# Patient Record
Sex: FEMALE | Race: WHITE | NOT HISPANIC OR LATINO | Employment: FULL TIME | URBAN - METROPOLITAN AREA
[De-identification: names, ages, dates, MRNs, and addresses within clinical notes are randomized per-mention and may not be internally consistent; named-entity substitution may affect disease eponyms.]

---

## 2020-11-12 ENCOUNTER — OFFICE VISIT (OUTPATIENT)
Dept: URGENT CARE | Facility: CLINIC | Age: 35
End: 2020-11-12
Payer: COMMERCIAL

## 2020-11-12 VITALS
HEIGHT: 66 IN | RESPIRATION RATE: 18 BRPM | SYSTOLIC BLOOD PRESSURE: 141 MMHG | HEART RATE: 96 BPM | DIASTOLIC BLOOD PRESSURE: 79 MMHG | BODY MASS INDEX: 28.77 KG/M2 | OXYGEN SATURATION: 97 % | TEMPERATURE: 99 F | WEIGHT: 179 LBS

## 2020-11-12 DIAGNOSIS — Z03.89 ENCOUNTER FOR OBSERVATION FOR OTHER SUSPECTED DISEASES AND CONDITIONS RULED OUT: Primary | ICD-10-CM

## 2020-11-12 DIAGNOSIS — Z20.822 EXPOSURE TO COVID-19 VIRUS: ICD-10-CM

## 2020-11-12 LAB
CTP QC/QA: YES
SARS-COV-2 RDRP RESP QL NAA+PROBE: NEGATIVE

## 2020-11-12 PROCEDURE — U0002: ICD-10-PCS | Mod: QW,S$GLB,, | Performed by: NURSE PRACTITIONER

## 2020-11-12 PROCEDURE — 99203 OFFICE O/P NEW LOW 30 MIN: CPT | Mod: S$GLB,,, | Performed by: NURSE PRACTITIONER

## 2020-11-12 PROCEDURE — U0002 COVID-19 LAB TEST NON-CDC: HCPCS | Mod: QW,S$GLB,, | Performed by: NURSE PRACTITIONER

## 2020-11-12 PROCEDURE — 99203 PR OFFICE/OUTPT VISIT, NEW, LEVL III, 30-44 MIN: ICD-10-PCS | Mod: S$GLB,,, | Performed by: NURSE PRACTITIONER

## 2020-11-12 RX ORDER — ESCITALOPRAM OXALATE 20 MG/1
20 TABLET ORAL
COMMUNITY

## 2020-11-12 NOTE — PATIENT INSTRUCTIONS
"Your test was NEGATIVE for COVID-19 (coronavirus).      You may leave home and/or return to work when the following conditions are met:   24 hours fever free without fever-reducing medications AND   Improved symptoms   You have not met the conditions of a close exposure       A "close exposure" is defined as anyone who has had an exposure (masked or unmasked) to a known COVID -19 positive person within 6 ft for longer than 15 minutes. If your exposure meets this definition you are required by CDC guidelines to quarantine for 14 days from time of exposure regardless of test status.      Additional instructions:  · Social distance per your local guidelines  · Call ahead before visiting your doctor.  · Wear a facemask when around others who do not live in your household.  · Cover your coughs and sneezes.  · Wash your hands often with soap and water; hand  can be used, too.      If your symptoms worsen or if you have any other concerns, please contact [facility] at [phone].     Sincerely,    GOLDIE Slaughter  "

## 2020-12-22 ENCOUNTER — OFFICE VISIT (OUTPATIENT)
Dept: URGENT CARE | Facility: CLINIC | Age: 35
End: 2020-12-22
Payer: COMMERCIAL

## 2020-12-22 VITALS
WEIGHT: 186 LBS | DIASTOLIC BLOOD PRESSURE: 82 MMHG | HEIGHT: 66 IN | HEART RATE: 72 BPM | TEMPERATURE: 98 F | BODY MASS INDEX: 29.89 KG/M2 | OXYGEN SATURATION: 97 % | SYSTOLIC BLOOD PRESSURE: 125 MMHG

## 2020-12-22 DIAGNOSIS — R09.81 NASAL CONGESTION: ICD-10-CM

## 2020-12-22 DIAGNOSIS — J02.9 SORE THROAT: Primary | ICD-10-CM

## 2020-12-22 LAB
CTP QC/QA: YES
SARS-COV-2 RDRP RESP QL NAA+PROBE: NEGATIVE

## 2020-12-22 PROCEDURE — U0002: ICD-10-PCS | Mod: QW,S$GLB,, | Performed by: PHYSICIAN ASSISTANT

## 2020-12-22 PROCEDURE — U0002 COVID-19 LAB TEST NON-CDC: HCPCS | Mod: QW,S$GLB,, | Performed by: PHYSICIAN ASSISTANT

## 2020-12-22 PROCEDURE — 99213 PR OFFICE/OUTPT VISIT, EST, LEVL III, 20-29 MIN: ICD-10-PCS | Mod: S$GLB,,, | Performed by: PHYSICIAN ASSISTANT

## 2020-12-22 PROCEDURE — 99213 OFFICE O/P EST LOW 20 MIN: CPT | Mod: S$GLB,,, | Performed by: PHYSICIAN ASSISTANT

## 2020-12-22 RX ORDER — MULTIVITAMIN
1 TABLET ORAL
COMMUNITY

## 2020-12-22 NOTE — PROGRESS NOTES
"Subjective:       Patient ID: Michelle Zarate is a 35 y.o. female.    Vitals:  height is 5' 6" (1.676 m) and weight is 84.4 kg (186 lb). Her tympanic temperature is 97.7 °F (36.5 °C). Her blood pressure is 125/82 and her pulse is 72. Her oxygen saturation is 97%.     Chief Complaint: Sore Throat    Symptoms began last Friday- sore throat, nasal congestion, fatigue, dry cough. Pt denies any covid positive exposure. Ran fever  of 100.7 Friday through Sunday. Pt has not had any fever since Sunday. Pt has taken tylenol for symptoms which gave mild relief.       Sore Throat   This is a new problem. The current episode started in the past 7 days. The problem has been unchanged. Neither side of throat is experiencing more pain than the other. There has been no fever. The pain is at a severity of 2/10. The pain is mild. Associated symptoms include congestion, coughing and a hoarse voice. Pertinent negatives include no abdominal pain, diarrhea, drooling, ear discharge, ear pain, headaches, plugged ear sensation, neck pain, shortness of breath, stridor, swollen glands, trouble swallowing or vomiting. She has had no exposure to strep or mono. She has tried acetaminophen for the symptoms. The treatment provided mild relief.       Constitution: Positive for fatigue. Negative for chills, sweating and fever.   HENT: Positive for congestion, postnasal drip and sore throat. Negative for ear pain, ear discharge, drooling, nosebleeds, sinus pain, sinus pressure, trouble swallowing and voice change.    Neck: Negative for neck pain and painful lymph nodes.   Eyes: Negative for eye redness.   Respiratory: Positive for cough. Negative for chest tightness, sputum production, bloody sputum, COPD, shortness of breath, stridor, wheezing and asthma.    Gastrointestinal: Negative for abdominal pain, nausea, vomiting and diarrhea.   Musculoskeletal: Negative for muscle ache.   Skin: Negative for rash and erythema.   Allergic/Immunologic: Negative " for seasonal allergies and asthma.   Neurological: Negative for headaches.   Hematologic/Lymphatic: Negative for swollen lymph nodes.       Objective:      Physical Exam   Constitutional: She is oriented to person, place, and time. She appears well-developed.   HENT:   Head: Normocephalic and atraumatic. Head is without abrasion, without contusion and without laceration.   Ears:   Right Ear: External ear normal.   Left Ear: External ear normal.   Nose: Nose normal.   Mouth/Throat: Mucous membranes are normal. Posterior oropharyngeal erythema present. No oropharyngeal exudate, posterior oropharyngeal edema or cobblestoning. No tonsillar exudate.   Eyes: Pupils are equal, round, and reactive to light. Conjunctivae, EOM and lids are normal.   Neck: Trachea normal, full passive range of motion without pain and phonation normal. Neck supple.   Cardiovascular: Normal rate, regular rhythm and normal heart sounds.   Pulmonary/Chest: Effort normal and breath sounds normal. No stridor. No respiratory distress.   Musculoskeletal: Normal range of motion.   Lymphadenopathy:     She has no cervical adenopathy.   Neurological: She is alert and oriented to person, place, and time.   Skin: Skin is warm, dry, intact and no rash. Capillary refill takes less than 2 seconds. abrasion, burn, bruising, erythema and ecchymosisPsychiatric: Her speech is normal and behavior is normal. Judgment and thought content normal.   Nursing note and vitals reviewed.    Results for orders placed or performed in visit on 12/22/20   POCT COVID-19 Rapid Screening   Result Value Ref Range    POC Rapid COVID Negative Negative     Acceptable Yes            Assessment:       1. Sore throat    2. Nasal congestion        Plan:       COVID testing negative.  Recommend OTC medications as needed for cold symptoms.  Tylenol as needed for fever.  Rest and drink plenty of fluids.  Patient can return to clinic or follow up with PCP if symptoms worsen or  fail to improve. Pt voiced understanding and all questions were answered prior to discharge.     Sore throat  -     POCT COVID-19 Rapid Screening    Nasal congestion  -     POCT COVID-19 Rapid Screening      Patient Instructions       Self-Care for Sore Throats    Sore throats happen for many reasons, such as colds, allergies, and infections caused by viruses or bacteria. In any case, your throat becomes red and sore. Your goal for self-care is to reduce your discomfort while giving your throat a chance to heal.  Moisten and soothe your throat  Tips include the following:  · Try a sip of water first thing after waking up.  · Keep your throat moist by drinking 6 or more glasses of clear liquids every day.  · Run a cool-air humidifier in your room overnight.  · Avoid cigarette smoke.   · Suck on throat lozenges, cough drops, hard candy, ice chips, or frozen fruit-juice bars. Use the sugar-free versions if your diet or medical condition requires them.  Gargle to ease irritation  Gargling every hour or 2 can ease irritation. Try gargling with 1 of these solutions:  · 1/4 teaspoon of salt in 1/2 cup of warm water  · An over-the-counter anesthetic gargle  Use medicine for more relief  Over-the-counter medicine can reduce sore throat symptoms. Ask your pharmacist if you have questions about which medicine to use:  · Ease pain with anesthetic sprays. Aspirin or an aspirin substitute also helps. Remember, never give aspirin to anyone 18 or younger, or if you are already taking blood thinners.   · For sore throats caused by allergies, try antihistamines to block the allergic reaction.  · Remember: unless a sore throat is caused by a bacterial infection, antibiotics wont help you.  Prevent future sore throats  Prevention tips include the following:  · Stop smoking or reduce contact with secondhand smoke. Smoke irritates the tender throat lining.  · Limit contact with pets and with allergy-causing substances, such as pollen  and mold.  · When youre around someone with a sore throat or cold, wash your hands often to keep viruses or bacteria from spreading.  · Dont strain your vocal cords.  Call your healthcare provider  Contact your healthcare provider if you have:  · A temperature over 101°F (38.3°C)  · White spots on the throat  · Great difficulty swallowing  · Trouble breathing  · A skin rash  · Recent exposure to someone else with strep bacteria  · Severe hoarseness and swollen glands in the neck or jaw   Date Last Reviewed: 8/1/2016 © 2000-2017 [employer]. [address] [place] [id number]. All rights reserved. This information is not intended as a substitute for professional medical care. Always follow your healthcare professional's instructions.

## 2020-12-22 NOTE — PATIENT INSTRUCTIONS

## 2020-12-22 NOTE — LETTER
[id number] [facility] [address] ? [place], [id number] ? Phone [phone] ? Fax             Return to Work/School    Patient: Michelle Zarate  YOB: 1985   Date: 12/22/2020      To Whom It May Concern:     Michelle Zarate was in contact with/seen in my office on 12/22/2020. COVID-19 is present in our communities across the [facility]. Not all patients are eligible or appropriate to be tested. In this situation, your employee meets the following criteria:     Michelle Zarate has met the criteria for COVID-19 testing and has a NEGATIVE result. The employee can return to work once they are asymptomatic for 24 hours without the use of fever reducing medications (Tylenol, Motrin, etc).     If you have any questions or concerns, or if I can be of further assistance, please do not hesitate to contact me.     Sincerely,    Destiny Mckeon PA-C

## 2021-04-28 ENCOUNTER — PATIENT MESSAGE (OUTPATIENT)
Dept: RESEARCH | Facility: HOSPITAL | Age: 36
End: 2021-04-28

## 2021-07-21 ENCOUNTER — OFFICE VISIT (OUTPATIENT)
Dept: URGENT CARE | Facility: CLINIC | Age: 36
End: 2021-07-21
Payer: COMMERCIAL

## 2021-07-21 VITALS
SYSTOLIC BLOOD PRESSURE: 133 MMHG | WEIGHT: 186 LBS | DIASTOLIC BLOOD PRESSURE: 73 MMHG | BODY MASS INDEX: 29.89 KG/M2 | OXYGEN SATURATION: 100 % | HEIGHT: 66 IN | TEMPERATURE: 98 F | HEART RATE: 82 BPM | RESPIRATION RATE: 18 BRPM

## 2021-07-21 DIAGNOSIS — R53.83 FATIGUE, UNSPECIFIED TYPE: ICD-10-CM

## 2021-07-21 DIAGNOSIS — B34.9 VIRAL SYNDROME: Primary | ICD-10-CM

## 2021-07-21 LAB
CTP QC/QA: YES
SARS-COV-2 RDRP RESP QL NAA+PROBE: NEGATIVE

## 2021-07-21 PROCEDURE — U0002: ICD-10-PCS | Mod: QW,S$GLB,, | Performed by: NURSE PRACTITIONER

## 2021-07-21 PROCEDURE — U0002 COVID-19 LAB TEST NON-CDC: HCPCS | Mod: QW,S$GLB,, | Performed by: NURSE PRACTITIONER

## 2021-07-21 PROCEDURE — 99214 OFFICE O/P EST MOD 30 MIN: CPT | Mod: S$GLB,,, | Performed by: NURSE PRACTITIONER

## 2021-07-21 PROCEDURE — 99214 PR OFFICE/OUTPT VISIT, EST, LEVL IV, 30-39 MIN: ICD-10-PCS | Mod: S$GLB,,, | Performed by: NURSE PRACTITIONER

## 2021-07-21 RX ORDER — IBUPROFEN 200 MG
TABLET ORAL EVERY 6 HOURS PRN
COMMUNITY
End: 2023-10-11

## 2021-07-21 RX ORDER — BUPROPION HYDROCHLORIDE 300 MG/1
300 TABLET ORAL DAILY
COMMUNITY
Start: 2021-04-09

## 2021-07-21 RX ORDER — LISINOPRIL 10 MG/1
10 TABLET ORAL DAILY
COMMUNITY
Start: 2021-04-09 | End: 2023-10-11

## 2021-07-21 RX ORDER — GABAPENTIN 300 MG/1
CAPSULE ORAL
COMMUNITY
End: 2022-10-05

## 2021-07-21 RX ORDER — DEXTROAMPHETAMINE SACCHARATE, AMPHETAMINE ASPARTATE, DEXTROAMPHETAMINE SULFATE AND AMPHETAMINE SULFATE 5; 5; 5; 5 MG/1; MG/1; MG/1; MG/1
1 TABLET ORAL 2 TIMES DAILY
COMMUNITY
Start: 2021-04-14 | End: 2022-10-05

## 2021-08-12 ENCOUNTER — OFFICE VISIT (OUTPATIENT)
Dept: URGENT CARE | Facility: CLINIC | Age: 36
End: 2021-08-12
Payer: COMMERCIAL

## 2021-08-12 VITALS
HEART RATE: 74 BPM | SYSTOLIC BLOOD PRESSURE: 123 MMHG | OXYGEN SATURATION: 99 % | DIASTOLIC BLOOD PRESSURE: 75 MMHG | RESPIRATION RATE: 18 BRPM | TEMPERATURE: 99 F

## 2021-08-12 DIAGNOSIS — B34.9 VIRAL SYNDROME: Primary | ICD-10-CM

## 2021-08-12 DIAGNOSIS — Z20.822 CLOSE EXPOSURE TO COVID-19 VIRUS: ICD-10-CM

## 2021-08-12 DIAGNOSIS — R50.9 FEVER, UNSPECIFIED FEVER CAUSE: ICD-10-CM

## 2021-08-12 DIAGNOSIS — Z11.52 ENCOUNTER FOR SCREENING FOR COVID-19: ICD-10-CM

## 2021-08-12 LAB
CTP QC/QA: YES
SARS-COV-2 RDRP RESP QL NAA+PROBE: NEGATIVE

## 2021-08-12 PROCEDURE — U0002 COVID-19 LAB TEST NON-CDC: HCPCS | Mod: QW,S$GLB,, | Performed by: PHYSICIAN ASSISTANT

## 2021-08-12 PROCEDURE — 99214 PR OFFICE/OUTPT VISIT, EST, LEVL IV, 30-39 MIN: ICD-10-PCS | Mod: S$GLB,,, | Performed by: PHYSICIAN ASSISTANT

## 2021-08-12 PROCEDURE — U0002: ICD-10-PCS | Mod: QW,S$GLB,, | Performed by: PHYSICIAN ASSISTANT

## 2021-08-12 PROCEDURE — 99214 OFFICE O/P EST MOD 30 MIN: CPT | Mod: S$GLB,,, | Performed by: PHYSICIAN ASSISTANT

## 2021-08-18 ENCOUNTER — OFFICE VISIT (OUTPATIENT)
Dept: URGENT CARE | Facility: CLINIC | Age: 36
End: 2021-08-18
Payer: COMMERCIAL

## 2021-08-18 VITALS
OXYGEN SATURATION: 100 % | HEIGHT: 66 IN | WEIGHT: 186 LBS | TEMPERATURE: 99 F | RESPIRATION RATE: 18 BRPM | DIASTOLIC BLOOD PRESSURE: 82 MMHG | HEART RATE: 101 BPM | BODY MASS INDEX: 29.89 KG/M2 | SYSTOLIC BLOOD PRESSURE: 133 MMHG

## 2021-08-18 DIAGNOSIS — R50.9 FEVER, UNSPECIFIED FEVER CAUSE: ICD-10-CM

## 2021-08-18 DIAGNOSIS — Z20.822 CLOSE EXPOSURE TO COVID-19 VIRUS: Primary | ICD-10-CM

## 2021-08-18 LAB
CTP QC/QA: YES
SARS-COV-2 RDRP RESP QL NAA+PROBE: NEGATIVE

## 2021-08-18 PROCEDURE — 99214 PR OFFICE/OUTPT VISIT, EST, LEVL IV, 30-39 MIN: ICD-10-PCS | Mod: S$GLB,,, | Performed by: NURSE PRACTITIONER

## 2021-08-18 PROCEDURE — U0002: ICD-10-PCS | Mod: QW,S$GLB,, | Performed by: NURSE PRACTITIONER

## 2021-08-18 PROCEDURE — 99214 OFFICE O/P EST MOD 30 MIN: CPT | Mod: S$GLB,,, | Performed by: NURSE PRACTITIONER

## 2021-08-18 PROCEDURE — U0002 COVID-19 LAB TEST NON-CDC: HCPCS | Mod: QW,S$GLB,, | Performed by: NURSE PRACTITIONER

## 2021-08-18 RX ORDER — VILAZODONE HYDROCHLORIDE 40 MG/1
TABLET ORAL
COMMUNITY
End: 2022-10-05

## 2022-01-10 ENCOUNTER — OFFICE VISIT (OUTPATIENT)
Dept: URGENT CARE | Facility: CLINIC | Age: 37
End: 2022-01-10
Payer: COMMERCIAL

## 2022-01-10 VITALS
HEART RATE: 64 BPM | WEIGHT: 186 LBS | SYSTOLIC BLOOD PRESSURE: 120 MMHG | RESPIRATION RATE: 16 BRPM | BODY MASS INDEX: 29.89 KG/M2 | HEIGHT: 66 IN | DIASTOLIC BLOOD PRESSURE: 73 MMHG | OXYGEN SATURATION: 100 % | TEMPERATURE: 98 F

## 2022-01-10 DIAGNOSIS — K52.9 GASTROENTERITIS: ICD-10-CM

## 2022-01-10 DIAGNOSIS — J02.9 PHARYNGITIS, UNSPECIFIED ETIOLOGY: ICD-10-CM

## 2022-01-10 DIAGNOSIS — R09.81 SINUS CONGESTION: Primary | ICD-10-CM

## 2022-01-10 LAB
CTP QC/QA: YES
SARS-COV-2 RDRP RESP QL NAA+PROBE: NEGATIVE

## 2022-01-10 PROCEDURE — U0002 COVID-19 LAB TEST NON-CDC: HCPCS | Mod: QW,S$GLB,, | Performed by: NURSE PRACTITIONER

## 2022-01-10 PROCEDURE — U0002: ICD-10-PCS | Mod: QW,S$GLB,, | Performed by: NURSE PRACTITIONER

## 2022-01-10 PROCEDURE — 99213 PR OFFICE/OUTPT VISIT, EST, LEVL III, 20-29 MIN: ICD-10-PCS | Mod: S$GLB,,, | Performed by: NURSE PRACTITIONER

## 2022-01-10 PROCEDURE — 99213 OFFICE O/P EST LOW 20 MIN: CPT | Mod: S$GLB,,, | Performed by: NURSE PRACTITIONER

## 2022-01-10 RX ORDER — ONDANSETRON 4 MG/1
4 TABLET, ORALLY DISINTEGRATING ORAL EVERY 6 HOURS PRN
Qty: 20 TABLET | Refills: 0 | Status: SHIPPED | OUTPATIENT
Start: 2022-01-10 | End: 2022-10-05

## 2022-01-10 RX ORDER — PREDNISONE 20 MG/1
40 TABLET ORAL
Status: COMPLETED | OUTPATIENT
Start: 2022-01-10 | End: 2022-01-10

## 2022-01-10 RX ORDER — AMOXICILLIN 500 MG/1
500 TABLET, FILM COATED ORAL EVERY 12 HOURS
Qty: 20 TABLET | Refills: 0 | Status: SHIPPED | OUTPATIENT
Start: 2022-01-10 | End: 2022-01-20

## 2022-01-10 RX ADMIN — PREDNISONE 40 MG: 20 TABLET ORAL at 07:01

## 2022-01-11 NOTE — PATIENT INSTRUCTIONS
Take amoxicillin tablets by mouth twice daily for 10 days - Pharyngitis   Take Zofran tablets as needed nausea or vomiting   Clear liquids over the next 24-48 hours advance as tolerated Patient Education       Sore Throat in Adults   The Basics   Written by the doctors and editors at [facility]   When should I see a doctor or nurse about a sore throat? -- Most people do not need to see a doctor about a sore throat. It usually gets better on its own. But sore throat can sometimes be serious.  See a doctor or nurse if:  · You have a fever of at least 101°F or 38.4°C  · Your throat pain is severe within the first 2 days, or does not start to improve within 5 to 7 days  During the coronavirus disease 2019 (COVID-19) pandemic, you should also call your doctor if you have a sore throat. They will ask you questions about your symptoms and whether you might be at risk. They can also tell you if you should get tested for the virus.  Call for an ambulance (in the [place] and [place], dial 9-1-1) or go to the emergency room if you:  · Have trouble breathing  · Are drooling because you cannot swallow your saliva  · Have swelling of the neck or tongue  · Cannot move your neck or have trouble opening your mouth  What causes sore throat? -- Sore throat is usually caused by an infection. Two types of germs can cause it: viruses and bacteria. People who have a sore throat caused by a virus do not usually need to see a doctor or nurse. However, during the COVID-19 pandemic, most people with a sore throat will need to be tested for the virus that causes COVID-19  People who have a sore throat caused by bacteria might need to see a doctor or nurse. They might have a type of infection called strep throat. Only about 1 in 10 adults who seek medical care for sore throat have strep throat.   How can I tell if my sore throat is caused by a virus or strep throat? -- It is hard to tell the difference. But there are some clues to look for.  People who  "have a sore throat caused by a virus usually have other symptoms, such as:  · A runny nose  · A stuffed-up chest  · Itchy or red eyes  · Cough  People who have COVID-19 can have different symptoms including fever, cough, trouble breathing, and problems with their sense of smell or taste. Some people have other symptoms, too. In most cases, the only way to know for sure if you have COVID-19 is to get tested.  People who have a sore throat caused by strep throat do not usually have a cough, runny nose, or itchy or red eyes. They might have been in close contact with another person who has strep throat. They might also have:  · Severe throat pain  · Fever (temperature higher than 100.4°F or 38°C)  · Swollen glands in the neck  · A rash  If you think you have strep throat, the doctor or nurse can check you for it easily. They can run a swab (Q-Tip) along the back of your throat and test it for the bacteria that cause strep throat.  Do I need antibiotics? -- If you have an infection caused by a virus, you do not need antibiotics. But if you have strep throat, you should get antibiotics. Most people with strep throat get better without antibiotics, but doctors and nurses often prescribe them anyway. That's because antibiotics can prevent problems sometimes caused by strep throat. Plus, antibiotics can reduce the symptoms of strep throat and prevent its spread to other people.  What can I do to feel better? -- If you want some relief from the pain of sore throat, you can take pain medicine that you can get without a prescription. Throat sprays are no better at soothing pain than sucking on cough drops or candy. Some people feel relief if they gargle with salt water.  When can I go back to work or school? -- If you have strep throat, wait 1 day after starting antibiotics. By then you will be a lot less likely to spread the infection.  If you have COVID-19, stay home from work or school and "self-isolate" until your doctor " or nurse tells you it's safe to stop. Self-isolation means staying apart from other people, even the people you live with. When you can stop self-isolation will depend on how long it has been since you had symptoms, and in some cases, whether you have had a negative test (showing that the virus is no longer in your body).  If you have a sore throat that is not due to strep throat or COVID-19, you can go back to your usual activities as soon as you feel well. But it's still a good idea to wash your hands often and cover your mouth if you cough.  What can I do to prevent getting a sore throat again? -- Wash your hands often with soap and water. It is one of the best ways to prevent the spread of infection. The table has instructions on how to wash your hands to prevent spreading illness (table 1).  All topics are updated as new evidence becomes available and our peer review process is complete.  This topic retrieved from [employer] on: Sep 21, 2021.  Topic 21680 Version 16.0  Release: 29.4.2 - C29.263  © 2021 UpToDate, Inc. and/or its affiliates. All rights reserved.  table 1: Hand washing to prevent spreading illness  · Wet your hands and put soap on them    · Rub your hands together for at least 20 seconds. Make sure to clean your wrists, fingernails, and in between your fingers.    · Rinse your hands    · Dry your hands with a paper towel that you can throw away    If you are not near a sink, you can use a hand gel to clean your hands. The gels with at least 60 percent alcohol work the best. But it is better to wash with soap and water if you can.  Graphic 172793 Version 3.0  Consumer Information Use and Disclaimer   This information is not specific medical advice and does not replace information you receive from your health care provider. This is only a brief summary of general information. It does NOT include all information about conditions, illnesses, injuries, tests, procedures, treatments, therapies, discharge  instructions or life-style choices that may apply to you. You must talk with your health care provider for complete information about your health and treatment options. This information should not be used to decide whether or not to accept your health care provider's advice, instructions or recommendations. Only your health care provider has the knowledge and training to provide advice that is right for you. The use of this information is governed by the [employer] End User License Agreement, available at https://www.AgentPair/en/solutions/M[employer]Innovations/about/britney.The use of [employer] content is governed by the [employer] Terms of Use. ©2021 UpToDate, Inc. All rights reserved.  Copyright   © 2021 UpToDate, Inc. and/or its affiliates. All rights reserved.  Patient Education       Diarrhea in Adolescents and Adults   The Basics   Written by the doctors and editors at [facility]   What is diarrhea? -- Diarrhea describes bowel movements that are runny or watery, and happen 3 or more times in a day. Diarrhea is very common. Most adolescents and adults have diarrhea about 4 times a year. Just about everyone has it at some point.  What causes diarrhea? -- Diarrhea can be caused by:  · Viruses  · Bacteria that live in food or water  · Parasites, such as tiny worms that you can catch in some countries  · Side effects from some medicines  · Problems digesting certain types of food  · Diseases that harm the digestive system (figure 1)  Is there anything I can do on my own to get better? -- Yes. Here are some things you can try at home:  · Drink a lot of liquids that have water, salt, and sugar. Good choices are water mixed with juice, flavored soda, and soup broth. If you are drinking enough fluids, your urine will be light yellow or almost clear.  · Try to eat a little food. Good choices are potatoes, noodles, rice, oatmeal, crackers, bananas, soup, and boiled vegetables. Salty foods also help.  Should I see a doctor or  "nurse? -- See your doctor or nurse if:  · You have more than 6 runny bowel movements in 24 hours  · You have blood in your bowel movements   · You have a fever higher than 101.3ºF (38.5ºC) that does not go away after a day  · You have severe belly pain  · You are 70 or older  · Your body has lost too much water. This is called "dehydration." Signs include:  ? Lots of diarrhea that is very watery  ? Feeling very tired  ? Thirst  ? Dry mouth or tongue  ? Muscle cramps  ? Dizziness  ? Confusion  ? Urine that is very yellow, or not needing to urinate for more than 5 hours  Will I need tests? -- Many people do not need to have tests. But it's possible that your doctor will do tests to check if you are dehydrated or to figure out what is causing your diarrhea. Your doctor might do:  · Blood tests  · Tests on a sample of your bowel movements  How is diarrhea treated? -- That depends on what is causing your diarrhea. You might not need any treatment. If you do, your doctor might recommend:  · Fluids through an "IV" - An IV is a thin tube that goes into your vein. People with a lot of diarrhea might need IV fluids to treat or prevent dehydration.  · Stopping some of your medicines  · Changing the foods you eat  · Antibiotics - These medicines treat bacterial infections. Most people do not need antibiotics, even if they have a bacterial infection. If you are very sick with fever and blood in your bowel movements, your doctor might prescribe antibiotics to help you get better faster.   · Medicines that ease diarrhea - These medicines include loperamide (brand name: Imodium), diphenoxylate-atropine (brand name: Lomotil), and bismuth subsalicylate (brand names: Pepto-Bismol, Kaopectate). You should not take loperamide or diphenoxylate-atropine if you have a fever or blood in your bowel movements. Also, taking too much loperamide has led to serious heart problems in some people. If you have health problems or already take other " "medicines, talk to your doctor or nurse before trying loperamide. For all of these medicines, it's important to not take more than the label tells you to.   Can diarrhea be prevented? -- You can reduce your chances of getting and spreading diarrhea by:  · Washing your hands after changing diapers, cooking, eating, going to the bathroom, taking out the trash, touching animals, and blowing your nose.  · Staying home from work or school until you feel better.  · Paying attention to food safety. Tips include:  ? Not drinking unpasteurized milk or foods made with it  ? Washing fruits and vegetables well before eating them  ? Keeping the refrigerator colder than 40ºF and the freezer below 0ºF  ? Cooking meat and seafood until well done  ? Cooking eggs until the yolk is firm  ? Washing hands, knives, and cutting boards after they touch raw food  For more tips on food safety, see the table (table 1).  All topics are updated as new evidence becomes available and our peer review process is complete.  This topic retrieved from [employer] on: Sep 21, 2021.  Topic 72487 Version 14.0  Release: 29.4.2 - C29.263  © 2021 UpToDate, Inc. and/or its affiliates. All rights reserved.  figure 1: Digestive system     This drawing shows the organs in the body that process food. Together these organs are called "the digestive system," or "digestive tract." As food travels through this system, the body absorbs nutrients and water.  Graphic 15687 Version 4.0    table 1: Tips for safe food handling[1]  Purchase    Do not buy already-cooked food that is stored next to raw food, even if it is stored on ice.   Do not buy food in cans that are dented, cracked, or have a bulging lid.   Storage    Make sure meat and poultry products are refrigerated when bought.   Use plastic bags to keep juices from meat and fish from touching other foods.   Store perishable items (that can go bad quickly) in the refrigerator within an hour of buying.   Keep " refrigerator temperature between 32 and 40°F (0 and 4°C) and freezer temperature at or below 0°F (-18°C).   Freeze meat and poultry that will not be cooked within 48 hours.   Freeze tuna, bluefish, and stefan-stefan that will not be cooked within 24 hours. Other fish can be stored in the refrigerator for 48 hours.   Do not store eggs on the refrigerator door (since that is the warmest part of the refrigerator).   Put leftovers in the refrigerator within 2 hours of cooking them.   Divide leftovers into parts and store in small containers.   Reheat leftovers to 165°F (74°C) before eating.   Preparation    Wash hands with soap and water before cooking and after handling raw meat, poultry, fish, or raw eggs.   Thaw frozen meats and fish in the refrigerator or microwave, not by leaving them out.   Marinate foods in the refrigerator, not at room temperature.   Avoid contact of cooked foods with forks, spoons, knives, plates, or areas that might not be clean.   Wash forks, spoons, knives, plates, and cutting areas with soap and water after they have touched raw meat, poultry, fish, or eggs.   Avoid letting the juices from uncooked meat, poultry, or fish touch cooked foods or foods that will be eaten raw.   Carefully wash all fresh fruits and vegetables.   Avoid recipes that include raw eggs.   Cooking    Use a meat thermometer.  · Cook beef, veal, and lamb (steaks, roasts, chops) to 145°F (63°C) and rest for 3 minutes.   · Cook ground beef, pork, veal, and lamb to 160°F (71°C).   · Cook poultry (chicken, turkey) to 165°F (74°C).   · Cook fresh pork (roasts, chops, ham that is not precooked) to 145°F (63°C) and rest for 3 minutes.   · Cook precooked ham to 140°F (60°C).   · Cook fish until the flesh is firm and separates easily with a fork.   · Cook shellfish until the flesh is firm.    Cook eggs until the yolk and white are firm.   Boil juices from raw meat or fish before using on cooked food.   Serving    Serve cooked foods  on clean plates with clean forks, spoons, and knives.   Keep hot foods at 140°F (60°C) and cold foods below 40°F (4°C).   Never leave foods at room temperature longer than 2 hours, or 1 hour if the room is hotter than 90°F (32°C).   Use coolers and ice packs to take perishable foods (that might go bad) away from home.   Graphic 96287 Version 7.0  Consumer Information Use and Disclaimer   This information is not specific medical advice and does not replace information you receive from your health care provider. This is only a brief summary of general information. It does NOT include all information about conditions, illnesses, injuries, tests, procedures, treatments, therapies, discharge instructions or life-style choices that may apply to you. You must talk with your health care provider for complete information about your health and treatment options. This information should not be used to decide whether or not to accept your health care provider's advice, instructions or recommendations. Only your health care provider has the knowledge and training to provide advice that is right for you. The use of this information is governed by the [employer] End User License Agreement, available at https://[email or web address].[facility]/en/solutions/Ruckus/about/britney.The use of [employer] content is governed by the [employer] Terms of Use. ©2021 UpToDate, Inc. All rights reserved.  Copyright   © 2021 UpToDate, Inc. and/or its affiliates. All rights reserved.

## 2022-01-11 NOTE — PROGRESS NOTES
"Subjective:       Patient ID: Michelle Zarate is a 36 y.o. female.    Vitals:  height is 5' 6" (1.676 m) and weight is 84.4 kg (186 lb). Her tympanic temperature is 98.3 °F (36.8 °C). Her blood pressure is 120/73 and her pulse is 64. Her respiration is 16 and oxygen saturation is 100%.     Chief Complaint: Headache    Headache   This is a new problem. The current episode started in the past 7 days (1/8/22). The problem occurs constantly. The problem has been gradually worsening. The pain is located in the bilateral, temporal and occipital region. The pain quality is similar to prior headaches. The quality of the pain is described as throbbing. The pain is at a severity of 5/10. The pain is moderate. Associated symptoms include anorexia, coughing, ear pain, facial sweating, a fever, insomnia, muscle aches, nausea, rhinorrhea, sinus pressure, a sore throat and vomiting. Pertinent negatives include no abdominal pain, abnormal behavior, back pain, blurred vision, drainage, eye pain, eye redness, eye watering, hearing loss, loss of balance, neck pain, numbness, phonophobia, photophobia, scalp tenderness, seizures, swollen glands, tingling, tinnitus, visual change, weakness or weight loss. She has tried nothing for the symptoms. The treatment provided no relief.       Constitution: Positive for fever.   HENT: Positive for ear pain, sinus pressure and sore throat. Negative for tinnitus and hearing loss.    Neck: Negative for neck pain.   Eyes: Negative for eye pain, eye redness, photophobia and blurred vision.   Respiratory: Positive for cough.    Gastrointestinal: Positive for nausea and vomiting. Negative for abdominal pain.   Musculoskeletal: Negative for back pain.   Neurological: Positive for headaches. Negative for loss of balance, numbness and seizures.   Psychiatric/Behavioral: The patient has insomnia.        Objective:      Physical Exam   Constitutional: She is oriented to person, place, and time.   HENT:   Head: " Normocephalic and atraumatic.   Ears:   Right Ear: Tympanic membrane, external ear and ear canal normal.   Left Ear: Tympanic membrane, external ear and ear canal normal.   Nose: Nose normal.   Mouth/Throat: Mucous membranes are moist. Posterior oropharyngeal erythema present.   Eyes: Conjunctivae are normal. Pupils are equal, round, and reactive to light.      extraocular movement intact   Neck: Neck supple.   Cardiovascular: Normal rate, regular rhythm, normal heart sounds and normal pulses.   Pulmonary/Chest: Effort normal and breath sounds normal.   Abdominal: Normal appearance.   Neurological: no focal deficit. She is alert and oriented to person, place, and time.   Skin: Skin is warm. Capillary refill takes less than 2 seconds.   Vitals reviewed.        Assessment:       1. Sinus congestion    2. Pharyngitis, unspecified etiology    3. Gastroenteritis          Plan:         Sinus congestion  -     POCT COVID-19 Rapid Screening    Pharyngitis, unspecified etiology    Gastroenteritis    Other orders  -     predniSONE tablet 40 mg  -     ondansetron (ZOFRAN-ODT) 4 MG TbDL; Take 1 tablet (4 mg total) by mouth every 6 (six) hours as needed (nausea/vomiting).  Dispense: 20 tablet; Refill: 0  -     amoxicillin (AMOXIL) 500 MG Tab; Take 1 tablet (500 mg total) by mouth every 12 (twelve) hours. for 10 days  Dispense: 20 tablet; Refill: 0         lucia amoxicillin tablets by mouth twice daily for 10 days - Pharyngitis   Take Zofran tablets as needed nausea or vomiting   Cear liquids over the next 24-48 hours advance as tolerated

## 2022-09-27 ENCOUNTER — OFFICE VISIT (OUTPATIENT)
Dept: URGENT CARE | Facility: CLINIC | Age: 37
End: 2022-09-27
Payer: COMMERCIAL

## 2022-09-27 VITALS
BODY MASS INDEX: 29.89 KG/M2 | RESPIRATION RATE: 16 BRPM | SYSTOLIC BLOOD PRESSURE: 114 MMHG | TEMPERATURE: 99 F | DIASTOLIC BLOOD PRESSURE: 72 MMHG | WEIGHT: 186 LBS | OXYGEN SATURATION: 99 % | HEART RATE: 90 BPM | HEIGHT: 66 IN

## 2022-09-27 DIAGNOSIS — R51.9 NONINTRACTABLE HEADACHE, UNSPECIFIED CHRONICITY PATTERN, UNSPECIFIED HEADACHE TYPE: ICD-10-CM

## 2022-09-27 DIAGNOSIS — R09.81 SINUS CONGESTION: ICD-10-CM

## 2022-09-27 DIAGNOSIS — H66.001 NON-RECURRENT ACUTE SUPPURATIVE OTITIS MEDIA OF RIGHT EAR WITHOUT SPONTANEOUS RUPTURE OF TYMPANIC MEMBRANE: Primary | ICD-10-CM

## 2022-09-27 DIAGNOSIS — J02.9 SORE THROAT: ICD-10-CM

## 2022-09-27 DIAGNOSIS — R05.9 COUGH: ICD-10-CM

## 2022-09-27 LAB
CTP QC/QA: YES
MOLECULAR STREP A: NEGATIVE
POC MOLECULAR INFLUENZA A AGN: NEGATIVE
POC MOLECULAR INFLUENZA B AGN: NEGATIVE
SARS-COV-2 RDRP RESP QL NAA+PROBE: NEGATIVE

## 2022-09-27 PROCEDURE — 99214 PR OFFICE/OUTPT VISIT, EST, LEVL IV, 30-39 MIN: ICD-10-PCS | Mod: S$GLB,,, | Performed by: PHYSICIAN ASSISTANT

## 2022-09-27 PROCEDURE — 87502 POCT INFLUENZA A/B MOLECULAR: ICD-10-PCS | Mod: QW,S$GLB,, | Performed by: PHYSICIAN ASSISTANT

## 2022-09-27 PROCEDURE — U0002: ICD-10-PCS | Mod: QW,S$GLB,, | Performed by: PHYSICIAN ASSISTANT

## 2022-09-27 PROCEDURE — 87502 INFLUENZA DNA AMP PROBE: CPT | Mod: QW,S$GLB,, | Performed by: PHYSICIAN ASSISTANT

## 2022-09-27 PROCEDURE — 87651 POCT STREP A MOLECULAR: ICD-10-PCS | Mod: QW,S$GLB,, | Performed by: PHYSICIAN ASSISTANT

## 2022-09-27 PROCEDURE — U0002 COVID-19 LAB TEST NON-CDC: HCPCS | Mod: QW,S$GLB,, | Performed by: PHYSICIAN ASSISTANT

## 2022-09-27 PROCEDURE — 87651 STREP A DNA AMP PROBE: CPT | Mod: QW,S$GLB,, | Performed by: PHYSICIAN ASSISTANT

## 2022-09-27 PROCEDURE — 99214 OFFICE O/P EST MOD 30 MIN: CPT | Mod: S$GLB,,, | Performed by: PHYSICIAN ASSISTANT

## 2022-09-27 RX ORDER — LISDEXAMFETAMINE DIMESYLATE 60 MG/1
60 CAPSULE ORAL EVERY MORNING
COMMUNITY
End: 2023-10-11

## 2022-09-27 RX ORDER — FLUTICASONE PROPIONATE 50 MCG
1 SPRAY, SUSPENSION (ML) NASAL DAILY
Qty: 16 ML | Refills: 0 | Status: SHIPPED | OUTPATIENT
Start: 2022-09-27 | End: 2023-10-11

## 2022-09-27 RX ORDER — AMOXICILLIN AND CLAVULANATE POTASSIUM 875; 125 MG/1; MG/1
1 TABLET, FILM COATED ORAL 2 TIMES DAILY
Qty: 20 TABLET | Refills: 0 | Status: SHIPPED | OUTPATIENT
Start: 2022-09-27 | End: 2022-10-07

## 2022-09-27 RX ORDER — GUAIFENESIN/DEXTROMETHORPHAN 100-10MG/5
10 SYRUP ORAL EVERY 4 HOURS PRN
Qty: 118 ML | Refills: 0 | Status: SHIPPED | OUTPATIENT
Start: 2022-09-27 | End: 2022-10-07

## 2022-09-27 NOTE — LETTER
[address] [facility] [address] ? [place], [address] ? Phone [phone] ? Fax             Return to Work/School    Patient: Michelle Zarate  YOB: 1985   Date: 09/27/2022      To Whom It May Concern:     Michelle Zarate was in contact with/seen in my office on 09/27/2022. COVID-19 is present in our communities across the [facility]. Not all patients are eligible or appropriate to be tested. In this situation, your employee meets the following criteria:     Michelle Zarate has met the criteria for COVID-19 testing and has a NEGATIVE result. The employee can return to work once they are asymptomatic for 24 hours without the use of fever reducing medications (Tylenol, Motrin, etc).     If you have any questions or concerns, or if I can be of further assistance, please do not hesitate to contact me.     Sincerely,    Destiny Mckeon PA-C

## 2022-09-27 NOTE — PROGRESS NOTES
"Subjective:       Patient ID: Michelle Zarate is a 37 y.o. female.    Vitals:  height is 5' 6" (1.676 m) and weight is 84.4 kg (186 lb). Her tympanic temperature is 98.5 °F (36.9 °C). Her blood pressure is 114/72 and her pulse is 90. Her respiration is 16 and oxygen saturation is 99%.     Chief Complaint: Sore Throat (Chest pain, headache,body aches, and fever)    Pt presents to the clinic today with sore throat, congestion, ear pain, headache, body aches, fever at home (100.3 last night) that all began about 3 days ago. Denies any sick contacts. Not taking any medications for symptoms. Pt requesting covid, flu, and strep test.     Sore Throat   This is a new problem. The current episode started in the past 7 days. The problem has been gradually worsening. Neither side of throat is experiencing more pain than the other. There has been no fever. The fever has been present for Less than 1 day. The pain is at a severity of 6/10. The pain is moderate. Associated symptoms include congestion, coughing, ear pain, headaches and a plugged ear sensation. Pertinent negatives include no abdominal pain, ear discharge, shortness of breath, swollen glands, trouble swallowing or vomiting. She has tried nothing for the symptoms.     Constitution: Positive for fever.   HENT:  Positive for ear pain, congestion and sore throat. Negative for ear discharge and trouble swallowing.    Cardiovascular: Negative.    Respiratory:  Positive for cough. Negative for shortness of breath and wheezing.    Gastrointestinal: Negative.  Negative for abdominal pain, nausea and vomiting.   Genitourinary: Negative.    Musculoskeletal:  Positive for muscle ache.   Neurological:  Positive for headaches.     Objective:      Physical Exam   Constitutional: She appears well-developed.  Non-toxic appearance. She appears ill. No distress.   HENT:   Head: Normocephalic and atraumatic.   Ears:   Right Ear: External ear and ear canal normal. Tympanic membrane is " erythematous and bulging. Tympanic membrane is not perforated. A middle ear effusion is present.   Left Ear: External ear and ear canal normal. Tympanic membrane is bulging.  No middle ear effusion.   Nose: Mucosal edema and congestion present.   Mouth/Throat: Uvula is midline. Mucous membranes are moist. Posterior oropharyngeal erythema and cobblestoning present. No oropharyngeal exudate. No tonsillar exudate.   Eyes: Conjunctivae and EOM are normal.   Neck: Neck supple.   Pulmonary/Chest: Effort normal and breath sounds normal.   Abdominal: Normal appearance.   Musculoskeletal: Normal range of motion.         General: Normal range of motion.   Lymphadenopathy:     She has no cervical adenopathy.   Neurological: no focal deficit. She is alert. She displays no weakness. Gait normal.   Skin: Skin is warm, dry, not diaphoretic, not pale and no rash.   Psychiatric: Her behavior is normal.       Results for orders placed or performed in visit on 09/27/22   POCT COVID-19 Rapid Screening   Result Value Ref Range    POC Rapid COVID Negative Negative     Acceptable Yes    POCT Influenza A/B MOLECULAR   Result Value Ref Range    POC Molecular Influenza A Ag Negative Negative, Not Reported    POC Molecular Influenza B Ag Negative Negative, Not Reported     Acceptable Yes    POCT Strep A, Molecular   Result Value Ref Range    Molecular Strep A, POC Negative Negative     Acceptable Yes        Assessment:       1. Non-recurrent acute suppurative otitis media of right ear without spontaneous rupture of tympanic membrane    2. Nonintractable headache, unspecified chronicity pattern, unspecified headache type    3. Sinus congestion    4. Cough    5. Sore throat          Plan:         Non-recurrent acute suppurative otitis media of right ear without spontaneous rupture of tympanic membrane  -     amoxicillin-clavulanate 875-125mg (AUGMENTIN) 875-125 mg per tablet; Take 1 tablet by mouth 2  (two) times daily. for 10 days  Dispense: 20 tablet; Refill: 0  -     dextromethorphan-guaiFENesin  mg/5 ml (ROBITUSSIN-DM)  mg/5 mL liquid; Take 10 mLs by mouth every 4 (four) hours as needed (cough).  Dispense: 118 mL; Refill: 0  -     fluticasone propionate (FLONASE) 50 mcg/actuation nasal spray; 1 spray (50 mcg total) by Each Nostril route once daily.  Dispense: 16 mL; Refill: 0    Nonintractable headache, unspecified chronicity pattern, unspecified headache type  -     POCT COVID-19 Rapid Screening  -     POCT Influenza A/B MOLECULAR    Sinus congestion  -     dextromethorphan-guaiFENesin  mg/5 ml (ROBITUSSIN-DM)  mg/5 mL liquid; Take 10 mLs by mouth every 4 (four) hours as needed (cough).  Dispense: 118 mL; Refill: 0  -     fluticasone propionate (FLONASE) 50 mcg/actuation nasal spray; 1 spray (50 mcg total) by Each Nostril route once daily.  Dispense: 16 mL; Refill: 0    Cough  -     dextromethorphan-guaiFENesin  mg/5 ml (ROBITUSSIN-DM)  mg/5 mL liquid; Take 10 mLs by mouth every 4 (four) hours as needed (cough).  Dispense: 118 mL; Refill: 0    Sore throat  -     POCT Strep A, Molecular

## 2022-09-30 ENCOUNTER — TELEPHONE (OUTPATIENT)
Dept: URGENT CARE | Facility: CLINIC | Age: 37
End: 2022-09-30
Payer: COMMERCIAL

## 2024-05-21 ENCOUNTER — HOSPITAL ENCOUNTER (OUTPATIENT)
Dept: RADIOLOGY | Facility: CLINIC | Age: 39
Discharge: HOME | End: 2024-05-21
Attending: PHYSICIAN ASSISTANT
Payer: COMMERCIAL

## 2024-05-21 ENCOUNTER — OFFICE VISIT (OUTPATIENT)
Dept: URGENT CARE | Facility: CLINIC | Age: 39
End: 2024-05-21
Payer: COMMERCIAL

## 2024-05-21 VITALS
TEMPERATURE: 100 F | DIASTOLIC BLOOD PRESSURE: 79 MMHG | HEART RATE: 108 BPM | RESPIRATION RATE: 16 BRPM | HEIGHT: 66 IN | WEIGHT: 143.31 LBS | SYSTOLIC BLOOD PRESSURE: 141 MMHG | BODY MASS INDEX: 23.03 KG/M2 | OXYGEN SATURATION: 96 %

## 2024-05-21 DIAGNOSIS — R09.89 RHONCHI: ICD-10-CM

## 2024-05-21 DIAGNOSIS — R05.8 PRODUCTIVE COUGH: ICD-10-CM

## 2024-05-21 DIAGNOSIS — J18.9 PNEUMONIA OF RIGHT LOWER LOBE DUE TO INFECTIOUS ORGANISM: Primary | ICD-10-CM

## 2024-05-21 PROCEDURE — 71046 X-RAY EXAM CHEST 2 VIEWS: CPT | Mod: S$GLB,,, | Performed by: RADIOLOGY

## 2024-05-21 PROCEDURE — 99213 OFFICE O/P EST LOW 20 MIN: CPT | Mod: S$GLB,,, | Performed by: PHYSICIAN ASSISTANT

## 2024-05-21 RX ORDER — PROMETHAZINE HYDROCHLORIDE AND DEXTROMETHORPHAN HYDROBROMIDE 6.25; 15 MG/5ML; MG/5ML
5 SYRUP ORAL EVERY 6 HOURS PRN
Qty: 180 ML | Refills: 0 | Status: SHIPPED | OUTPATIENT
Start: 2024-05-21

## 2024-05-21 RX ORDER — AZITHROMYCIN 250 MG/1
TABLET, FILM COATED ORAL
Qty: 6 EACH | Refills: 0 | Status: SHIPPED | OUTPATIENT
Start: 2024-05-21 | End: 2024-05-26

## 2024-05-21 RX ORDER — AMOXICILLIN 500 MG/1
1000 CAPSULE ORAL EVERY 8 HOURS
Qty: 30 CAPSULE | Refills: 0 | Status: SHIPPED | OUTPATIENT
Start: 2024-05-21 | End: 2024-05-26

## 2024-05-21 NOTE — LETTER
May 21, 2024      [facility]  [id number] [place] [address] [address]  [facility] [address]  Phone: [phone]       Patient: Michelle Zarate   YOB: 1985  Date of Visit: 05/21/2024    To Whom It May Concern:    Dipak Zarate  was at [facility] on 05/21/2024. The patient may return to work/school on 05/26/24 with no restrictions. If you have any questions or concerns, or if I can be of further assistance, please do not hesitate to contact me.    Sincerely,    Destiny Mckeon PA-C

## 2024-05-21 NOTE — PROGRESS NOTES
"Subjective:      Patient ID: Michelle Zarate is a 39 y.o. female.    Vitals:  height is 5' 6.42" (1.687 m) and weight is 65 kg (143 lb 4.8 oz). Her oral temperature is 99.8 °F (37.7 °C). Her blood pressure is 141/79 (abnormal) and her pulse is 108. Her respiration is 16 and oxygen saturation is 96%.     Chief Complaint: Fever (Entered by patient) and Cough    Pt presents to the clinic today with productive cough, body aches, right ear pressure, and dizziness that all began 5 days ago. Cough is consistent. Denies wheezing or chest pain but feels congestion in her chest.  Tmax 102.5 F, fever has been consistently daily but has been decreasing (temp today 99.8). Taking Mucinex, tylenol and ibuprofen with little relief.     Fever   This is a new problem. The current episode started in the past 7 days. The problem occurs intermittently. The maximum temperature noted was 100 to 100.9 F. Temperature source: Forehead. Associated symptoms include coughing and muscle aches. Pertinent negatives include no abdominal pain, chest pain, congestion, diarrhea, ear pain, headaches, nausea, rash, sleepiness, sore throat, urinary pain, vomiting or wheezing. She has tried acetaminophen (6:00 am this morning) for the symptoms. The treatment provided mild relief.   Risk factors: recent sickness    Risk factors: no contaminated food, no contaminated water, no hx of cancer, no immunosuppression, no occupational exposure, no recent travel and no sick contacts    Cough  This is a new problem. The current episode started in the past 7 days. The problem has been unchanged. The problem occurs every few minutes. The cough is Productive of sputum. Associated symptoms include ear congestion, a fever, nasal congestion and postnasal drip. Pertinent negatives include no chest pain, chills, ear pain, headaches, heartburn, hemoptysis, myalgias, rash, rhinorrhea, sore throat, shortness of breath, sweats, weight loss or wheezing. Nothing aggravates the " symptoms. She has tried nothing for the symptoms. There is no history of asthma, bronchiectasis, bronchitis, COPD, emphysema, environmental allergies or pneumonia.       Constitution: Positive for fatigue and fever. Negative for chills.   HENT:  Positive for postnasal drip. Negative for ear pain, congestion and sore throat.    Cardiovascular:  Negative for chest pain, palpitations and sob on exertion.   Respiratory:  Positive for cough and sputum production. Negative for bloody sputum, shortness of breath, wheezing and asthma.    Gastrointestinal:  Negative for abdominal pain, nausea, vomiting, diarrhea and heartburn.   Genitourinary:  Negative for dysuria.   Musculoskeletal:  Negative for muscle ache.   Skin:  Negative for rash.   Allergic/Immunologic: Negative for environmental allergies and asthma.   Neurological:  Negative for passing out and headaches.      Objective:     Physical Exam   Constitutional: She appears well-developed.  Non-toxic appearance. She appears ill. No distress.   HENT:   Head: Normocephalic and atraumatic.   Ears:   Right Ear: External ear and ear canal normal. Tympanic membrane is not erythematous and not bulging. A middle ear effusion (serous) is present.   Left Ear: Tympanic membrane, external ear and ear canal normal.   Nose: Nose normal.   Eyes: Conjunctivae and EOM are normal.   Neck: Neck supple.   Cardiovascular: Normal rate and regular rhythm.   Pulmonary/Chest: Effort normal. No respiratory distress. She has no wheezes. She has rhonchi.   Frequent coughing; worse with deep breaths         Comments: Frequent coughing; worse with deep breaths    Abdominal: Normal appearance.   Musculoskeletal: Normal range of motion.         General: Normal range of motion.   Neurological: no focal deficit. She is alert. She displays no weakness. Gait normal.   Skin: Skin is warm, dry, not diaphoretic, not pale and no rash.   Psychiatric: Her behavior is normal.     XR CHEST PA AND  LATERAL    Result Date: 5/21/2024  EXAM:  XR CHEST PA AND LATERAL CLINICAL HISTORY:  Cough FINDINGS: 2 views. The heart is normal in size. Right lower lobe hazy consolidation compatible with pneumonia. Left lung is clear.     Right lower lobe pneumonia. Finalized on: 5/21/2024 10:31 AM By:  Gianluca Mcneal MD [facility]# [id number]      2024-05-21 [id number]    [facility]     Assessment:     1. Pneumonia of right lower lobe due to infectious organism    2. Productive cough    3. Rhonchi        Plan:     Full course of antibiotics.  Promethazine DM as needed for cough.  Continue to monitor for fever and treat with Tylenol or ibuprofen as needed.  Advised patient schedule close follow-up with her PCP to ensure resolution of pneumonia.  Sooner follow-up if any new or worsening symptoms or fever does not resolve.    Pneumonia of right lower lobe due to infectious organism  -     azithromycin (Z-MAHNAZ) 250 MG tablet; Take 2 tablets by mouth on day 1; Take 1 tablet by mouth on days 2-5  Dispense: 6 each; Refill: 0  -     amoxicillin (AMOXIL) 500 MG capsule; Take 2 capsules (1,000 mg total) by mouth every 8 (eight) hours. for 5 days  Dispense: 30 capsule; Refill: 0    Productive cough  -     XR CHEST PA AND LATERAL; Future; Expected date: 05/21/2024  -     promethazine-dextromethorphan (PROMETHAZINE-DM) 6.25-15 mg/5 mL Syrp; Take 5 mLs by mouth every 6 (six) hours as needed (cough). May cause drowsiness.  Dispense: 180 mL; Refill: 0    Rhonchi  -     XR CHEST PA AND LATERAL; Future; Expected date: 05/21/2024